# Patient Record
Sex: MALE | Race: OTHER | Employment: OTHER | URBAN - METROPOLITAN AREA
[De-identification: names, ages, dates, MRNs, and addresses within clinical notes are randomized per-mention and may not be internally consistent; named-entity substitution may affect disease eponyms.]

---

## 2017-03-02 ENCOUNTER — PREPPED CHART (OUTPATIENT)
Dept: URBAN - METROPOLITAN AREA CLINIC 39 | Facility: CLINIC | Age: 57
End: 2017-03-02

## 2018-03-08 ENCOUNTER — ESTABLISHED COMPREHENSIVE EXAM (OUTPATIENT)
Dept: URBAN - METROPOLITAN AREA CLINIC 39 | Facility: CLINIC | Age: 58
End: 2018-03-08

## 2018-03-08 DIAGNOSIS — H04.123: ICD-10-CM

## 2018-03-08 DIAGNOSIS — H25.093: ICD-10-CM

## 2018-03-08 PROCEDURE — 92014 COMPRE OPH EXAM EST PT 1/>: CPT

## 2018-03-08 PROCEDURE — 92015 DETERMINE REFRACTIVE STATE: CPT

## 2018-03-08 ASSESSMENT — VISUAL ACUITY
OD_SC: J8
OD_CC: J1+
OD_SC: 20/30
OS_CC: 20/25
OS_CC: J1+
OD_CC: 20/25-1
OS_SC: J8
OS_SC: 20/30

## 2018-03-08 ASSESSMENT — TONOMETRY
OS_IOP_MMHG: 14
OD_IOP_MMHG: 14

## 2019-03-12 ENCOUNTER — ESTABLISHED COMPREHENSIVE EXAM (OUTPATIENT)
Dept: URBAN - METROPOLITAN AREA CLINIC 39 | Facility: CLINIC | Age: 59
End: 2019-03-12

## 2019-03-12 DIAGNOSIS — H25.091: ICD-10-CM

## 2019-03-12 DIAGNOSIS — H04.123: ICD-10-CM

## 2019-03-12 DIAGNOSIS — H25.092: ICD-10-CM

## 2019-03-12 PROCEDURE — 92014 COMPRE OPH EXAM EST PT 1/>: CPT

## 2019-03-12 PROCEDURE — 92015 DETERMINE REFRACTIVE STATE: CPT

## 2019-03-12 ASSESSMENT — VISUAL ACUITY
OD_CC: J1+
OS_SC: 20/25
OS_CC: 20/25
OS_CC: J1+
OD_SC: 20/30-1
OS_SC: J5
OD_SC: J5
OD_CC: 20/25

## 2019-03-12 ASSESSMENT — TONOMETRY
OD_IOP_MMHG: 11
OS_IOP_MMHG: 14

## 2020-03-10 NOTE — PATIENT DISCUSSION
"Refer to Dr. Lauryn Moya for eval and possible ""ridley weights"" to aid in complete lid closure. "

## 2021-05-20 ENCOUNTER — ESTABLISHED COMPREHENSIVE EXAM (OUTPATIENT)
Dept: URBAN - METROPOLITAN AREA CLINIC 39 | Facility: CLINIC | Age: 61
End: 2021-05-20

## 2021-05-20 DIAGNOSIS — H04.123: ICD-10-CM

## 2021-05-20 DIAGNOSIS — H25.091: ICD-10-CM

## 2021-05-20 DIAGNOSIS — H25.092: ICD-10-CM

## 2021-05-20 PROCEDURE — 92014 COMPRE OPH EXAM EST PT 1/>: CPT

## 2021-05-20 PROCEDURE — 92015 DETERMINE REFRACTIVE STATE: CPT

## 2021-05-20 ASSESSMENT — KERATOMETRY
OS_K1POWER_DIOPTERS: 44.00
OS_AXISANGLE2_DEGREES: 151
OS_AXISANGLE_DEGREES: 61
OS_K2POWER_DIOPTERS: 45.00
OD_AXISANGLE2_DEGREES: 25
OD_K2POWER_DIOPTERS: 44.50
OD_K1POWER_DIOPTERS: 43.25
OD_AXISANGLE_DEGREES: 115

## 2021-05-20 ASSESSMENT — VISUAL ACUITY
OD_SC: 20/50
OU_SC: J10
OS_CC: J2
OD_CC: 20/40-1
OU_SC: 20/30-2
OU_CC: J1
OD_SC: J10
OS_SC: 20/40+2
OS_SC: <J12
OU_CC: 20/30
OS_CC: 20/40+1
OD_CC: J1

## 2021-05-20 ASSESSMENT — TONOMETRY
OS_IOP_MMHG: 13
OD_IOP_MMHG: 11

## 2022-05-25 ENCOUNTER — COMPREHENSIVE EXAM (OUTPATIENT)
Dept: URBAN - METROPOLITAN AREA CLINIC 39 | Facility: CLINIC | Age: 62
End: 2022-05-25

## 2022-05-25 DIAGNOSIS — H52.03: ICD-10-CM

## 2022-05-25 DIAGNOSIS — H52.203: ICD-10-CM

## 2022-05-25 DIAGNOSIS — H52.4: ICD-10-CM

## 2022-05-25 DIAGNOSIS — H04.123: ICD-10-CM

## 2022-05-25 DIAGNOSIS — H25.093: ICD-10-CM

## 2022-05-25 PROCEDURE — 92014 COMPRE OPH EXAM EST PT 1/>: CPT

## 2022-05-25 PROCEDURE — 92499OP2 OPTOMAP RETINAL SCREENING BOTH EYES

## 2022-05-25 PROCEDURE — 92015 DETERMINE REFRACTIVE STATE: CPT

## 2022-05-25 ASSESSMENT — VISUAL ACUITY
OD_SC: 20/40
OS_SC: J12
OD_CC: J2
OS_CC: 20/25
OS_SC: 20/30-2
OS_CC: J1
OD_CC: 20/20-1
OD_SC: J12

## 2022-05-25 ASSESSMENT — KERATOMETRY
OD_AXISANGLE2_DEGREES: 25
OS_AXISANGLE_DEGREES: 61
OD_K1POWER_DIOPTERS: 43.25
OD_K2POWER_DIOPTERS: 44.50
OD_AXISANGLE_DEGREES: 115
OS_AXISANGLE2_DEGREES: 151
OS_K1POWER_DIOPTERS: 44.00
OS_K2POWER_DIOPTERS: 45.00

## 2022-05-25 ASSESSMENT — TONOMETRY
OD_IOP_MMHG: 10
OS_IOP_MMHG: 11

## 2023-05-25 ENCOUNTER — COMPREHENSIVE EXAM (OUTPATIENT)
Dept: URBAN - METROPOLITAN AREA CLINIC 39 | Facility: CLINIC | Age: 63
End: 2023-05-25

## 2023-05-25 DIAGNOSIS — H04.123: ICD-10-CM

## 2023-05-25 DIAGNOSIS — H25.093: ICD-10-CM

## 2023-05-25 DIAGNOSIS — H52.4: ICD-10-CM

## 2023-05-25 DIAGNOSIS — H52.03: ICD-10-CM

## 2023-05-25 DIAGNOSIS — H52.203: ICD-10-CM

## 2023-05-25 PROCEDURE — 92014 COMPRE OPH EXAM EST PT 1/>: CPT

## 2023-05-25 PROCEDURE — 92015 DETERMINE REFRACTIVE STATE: CPT

## 2023-05-25 ASSESSMENT — VISUAL ACUITY
OS_SC: J12
OU_SC: 20/30-2
OD_CC: J1+
OU_SC: J12
OU_CC: 20/20
OS_CC: 20/20
OU_CC: J1+
OD_SC: >J12
OS_SC: 20/40+2
OS_CC: J1+
OD_CC: 20/20-1
OD_SC: 20/30-2

## 2023-05-25 ASSESSMENT — KERATOMETRY
OS_AXISANGLE2_DEGREES: 151
OD_AXISANGLE2_DEGREES: 25
OS_AXISANGLE_DEGREES: 61
OD_K2POWER_DIOPTERS: 44.50
OD_AXISANGLE_DEGREES: 115
OD_K1POWER_DIOPTERS: 43.25
OS_K1POWER_DIOPTERS: 44.00
OS_K2POWER_DIOPTERS: 45.00

## 2023-05-25 ASSESSMENT — TONOMETRY
OD_IOP_MMHG: 12
OS_IOP_MMHG: 12

## 2024-10-02 ENCOUNTER — COMPREHENSIVE EXAM (OUTPATIENT)
Dept: URBAN - METROPOLITAN AREA CLINIC 39 | Facility: CLINIC | Age: 64
End: 2024-10-02

## 2024-10-02 DIAGNOSIS — H52.03: ICD-10-CM

## 2024-10-02 DIAGNOSIS — H52.4: ICD-10-CM

## 2024-10-02 DIAGNOSIS — H52.203: ICD-10-CM

## 2024-10-02 DIAGNOSIS — H25.093: ICD-10-CM

## 2024-10-02 DIAGNOSIS — H04.123: ICD-10-CM

## 2024-10-02 PROCEDURE — 92015 DETERMINE REFRACTIVE STATE: CPT

## 2024-10-02 PROCEDURE — 92014 COMPRE OPH EXAM EST PT 1/>: CPT
